# Patient Record
Sex: FEMALE
[De-identification: names, ages, dates, MRNs, and addresses within clinical notes are randomized per-mention and may not be internally consistent; named-entity substitution may affect disease eponyms.]

---

## 2018-01-31 ENCOUNTER — HOSPITAL ENCOUNTER (OUTPATIENT)
Dept: HOSPITAL 14 - H.OPSURG | Age: 38
Discharge: HOME | End: 2018-01-31
Attending: PODIATRIST
Payer: COMMERCIAL

## 2018-01-31 VITALS — SYSTOLIC BLOOD PRESSURE: 105 MMHG | OXYGEN SATURATION: 99 % | HEART RATE: 87 BPM | DIASTOLIC BLOOD PRESSURE: 65 MMHG

## 2018-01-31 VITALS — RESPIRATION RATE: 18 BRPM

## 2018-01-31 VITALS — TEMPERATURE: 98 F

## 2018-01-31 VITALS — BODY MASS INDEX: 33.3 KG/M2

## 2018-01-31 DIAGNOSIS — M77.31: ICD-10-CM

## 2018-01-31 DIAGNOSIS — M79.671: Primary | ICD-10-CM

## 2018-01-31 DIAGNOSIS — M72.2: ICD-10-CM

## 2018-01-31 PROCEDURE — 28008 INCISION OF FOOT FASCIA: CPT

## 2018-01-31 PROCEDURE — 73630 X-RAY EXAM OF FOOT: CPT

## 2018-01-31 PROCEDURE — 97116 GAIT TRAINING THERAPY: CPT

## 2018-01-31 PROCEDURE — 28119 REMOVAL OF HEEL SPUR: CPT

## 2018-01-31 PROCEDURE — 97161 PT EVAL LOW COMPLEX 20 MIN: CPT

## 2018-01-31 NOTE — CP.SDSHP
[facility]





- History


Proposed Procedure: right open plantar fasciotomy


Pre-Op Diagnosis: right painful plantar fasciotomy





- Allergies


Allergies: 


Allergies





No Known Allergies Allergy (Verified 01/31/18 06:38)


 











- Physical Exam


Vital Signs: 


 Vital Signs











  01/31/18 01/31/18





  07:00 07:36


 


Temperature 98.3 F 


 


Pulse Rate 68 68


 


Respiratory 18 





Rate  


 


Blood Pressure 111/76 


 


O2 Sat by Pulse 99 





Oximetry  











Mental Status: Alert & Oriented x3





- {Optional Preform as Required}


Integument: WNL





- Impression


Impression: Pt was seen and examined in SDS.  Pt NPO status was confirmed.  All 

pre-op testing and clearance in chart.  Pt has exhausted all conservative 

treatment at this time and is opting for surgical intervention.  Pt was 

explained procedure and post-operative course.  All pt's questions were 

answered to satisfaction.  No guarantees were made.  Pt understands all risks, 

benefits and complications of procedure.  Pt will follow-up with Dr. Sosa 

within 1 week of surgery





- Date & Time


Date: 01/31/18


Time: 07:30





Short Stay Discharge





- Short Stay Discharge


Admitting Diagnosis/Reason for Visit: M79.671


Disposition: HOME/ ROUTINE


Referrals: 


Vikas Grace Jr., MD [Primary Care Provider] - 


Instructions:  RICE Therapy (GEN)


Additional Instructions (Diet, Activity): 


-Patient in good/stable condition for discharge home


-Pt to resume medications per medical reconciliation


-Resume regular diet


Please keep dressing clean, dry, & intact 


-Use plastic bag over bandage for showering


-Wear post op shoe at all times when ambulating


-Call clinic if you see signs of infection (redness, swelling, malodor)


-Please make an appointment to see Dr. Sosa in office/clinic within 1 week 

for post-op check





Progress Note/Discharge Note with Instructions: 





- Patient evaluated bedside in recovery s/p surgical procedure.


- After surgical procedure patient in NAD


- (+) Void, (+) Appetite


- Capillary refill time <3s and NVSI intact.


- Patient denies complaints at this time


- Post operative instructions and plan of care explained to patient at length.


- Pt. acknowledges understanding.


- Patient stable for DC per podiatric surgery

## 2018-01-31 NOTE — CP.PCM.PN
Subjective





- Date & Time of Evaluation


Date of Evaluation: 01/31/18


Time of Evaluation: 07:52





- Subjective


Subjective: 





Podiatry Elva-operative Note- Dr. Sosa





37 y.o F with no PMH seen in [facility] for right open plantar fasciotomy. Patient 

reports that she had had the bilaterally foot pain R>L for a while. She 

describes the pain as a sharp pain that is worse with stance and first step in 

the morning. She rates her pain 7/10 today on VAS scale. She has tried 

stretching, night splint, and conservative treatment without relief. Patient 

has no other pedal complaint at this time. She reports last drinking or eating 

at 9pm last night. She reports no problems with anesthesia. She denies n/v/sob/

cp/chills/f or d.





PMH: none


PSH: wisdom tooth removal


SH: denies smoking, drinking or ilicit drug use


MEDS: multivitamin and omega 3


FH:father-benign brain tumor, high cholesterol; mother-DM and HTN


ALL: NKDA 





Objective





- Vital Signs/Intake and Output


Vital Signs (last 24 hours): 


 











Temp Pulse Resp BP Pulse Ox


 


 98.3 F   68   18   111/76   99 


 


 01/31/18 07:00  01/31/18 07:36  01/31/18 07:00  01/31/18 07:00  01/31/18 07:00











- Constitutional


Appears: Well, Non-toxic, No Acute Distress





- Extremities Exam


Additional comments: 





Vasc: DP and PT 2/4 bilaterally, CFT < 3 seconds x 10 digits, temperature warm 

to cool from proximal to distal toes, mild non-pitting edema noted to the right 

plantar rearfoot





Ortho: MM is 5/5 in all four compartments: dorsiflexion, plantarflexion, 

inversion, and eversion; Pain upon palpation of the medial plantar calcaneal 

tuberosity bilaterally R> L and central aspect of plantar heel bilaterally; 

pain elicited upon palpation along the plantar medial arch bilaterally; gastroc 

equinus deformity noted bilaterally with limitation of ankle dorsiflexion upon 

extension of knees and beyond 90 degrees of ankle dorsiflexion upon flexion of 

both knees; flexible pes planus deformity noted bilaterally with decrease arch 

height upon weightbearing; STJ inversion/eversion is 20 degrees/10 degrees 

bilaterally; heels are in 2 degrees valgus upon NCSP evaluation bilaterally; 

bilateral MTJ is congruent; no HAV deformity noted bilaterally with normal 1st 

MPJ ROM; no crepitus noted; no hammertoes noted





Gait: antalgic gait noted secondary to bilateral heel pain; increased pronation 

noted throughout the midstance phase of gait; early heel rise present 

bilaterally secondary to equinus; decreased arch height noted throughout the 

stance phase of gait bilaterally





Neuro: protective and gross sensation intact bilaterally





Derm: no open lesions noted, skin tugor WNL, no clinical signs of infection





- Neurological Exam


Neurological Exam: Alert, Awake, Oriented x3





- Psychiatric Exam


Psychiatric exam: Normal Affect, Normal Mood





Assessment and Plan





- Assessment and Plan (Free Text)


Assessment: 





37 y.o F with no PMH seen in [facility] for right open plantar fasciotomy


Plan: 





Pt was seen and examined in SDS





Pt NPO status was confirmed


All pre-op testing and clearance in chart


Pt has exhausted all conservative treatment at this time and is opting for 

surgical intervention


Pt was explained procedure and post-operative course


All pt's questions were answered to satisfaction


No guarantees were made


Pt understands all risks, benefits and complications of procedure


Pt will follow-up with Dr. Sosa within 1 week of surgery

## 2018-01-31 NOTE — RAD
PROCEDURE:  Right Foot Radiographs.



HISTORY:

s/p right foot plantar fasciotomy  



COMPARISON:

None.



FINDINGS:



BONES:

Inferior posterior calcaneal spurring. No fracture. 



JOINTS:

Normal. 



SOFT TISSUES:

Normal. 



OTHER FINDINGS:

None.



IMPRESSION:

Calcaneal spurring.

## 2018-01-31 NOTE — PCM.SURG1
Surgeon's Initial Post Op Note





- Surgeon's Notes


Surgeon: Dr. Juancarlos Sosa, DPM


Assistant: Matt Waite PGY1


Type of Anesthesia: IV Sedation, Local


Anesthesia Administered By: Dr. Altamirano


Pre-Operative Diagnosis: Painful plantar fasciitis right foot


Operative Findings: See dictation report.  M- 3-0 nylon.  I- Pre-op: 25 cc 1:1 1

% lidocaine plain, 0.5% marcaine plain.  1 cc Kenalog-40


Post-Operative Diagnosis: Same


Operation Performed: 1. Plantar fasciotomy with C-arm assistance.  2. 

Debridement of calcaneal spur with manual bone rasp


Specimen/Specimens Removed: None


Estimated Blood Loss: EBL {In ML}: 0


Blood Products Given: N/A


Drains Used: No Drains


Post-Op Condition: Good


Date of Surgery/Procedure: 01/31/18


Time of Surgery/Procedure: 09:07

## 2018-02-01 NOTE — OP
PROCEDURE DATE:  01/31/2018



PATIENT'S AGE:  37



SURGEON:  Juancarlos Sosa DPM.



ASSISTANT:  Matt Waite, PGY-1.



ANESTHESIOLOGIST:  Naif Altamirano MD.



ANESTHESIA:  IV sedation with local.



PREOPERATIVE DIAGNOSIS:  Painful plantar fascitis of the right foot.



POSTOPERATIVE DIAGNOSIS:  Painful plantar fascitis of the right foot.



NAME OF PROCEDURE:

1.  Plantar fasciotomy with C-arm assistance.

2.  Debridement of calcaneal spur with manual bone rasp.



INDICATION:  The patient is a 37-year-old female with the above diagnosis. 

The patient has exhausted all conservative treatments at this time and now

requires surgical intervention.  The patient signed consent after careful

explanation of risks, benefits, complications, and alternatives for

surgical procedure.  No guarantees were given nor implied.  N.p.o. status

was confirmed prior to taking the patient to the OR.



PREPARATION:  The patient was brought into the operating room and placed on

the operating room table in the supine position.  Timeout was performed for

identification of the correct patient and procedure.  After induction of IV

sedation, the patient received a total of 25 mL of a 1:1 mixture of 1%

lidocaine plain and 0.5% Marcaine plain in a local block fashion to the

right plantar foot at the level of the plantar fascial insertion into the

calcaneus.  The right lower extremity was then prepped and draped in the

normal sterile manner and the procedure began.  No tourniquet was used

during the procedure.



DESCRIPTION OF PROCEDURE:

PROCEDURE 1:  Plantar fasciotomy with C-arm assistance.  Attention was then

turned to the plantar aspect of the patient's right foot where

preoperatively the area of maximum pain had been marked out using a marking

pen. C-arm fluoroscopy was then used along with an 18-gauge spinal needle

to determine the level of the plantar calcaneal tuberosity where the

plantar fascia inserted into the calcaneus.  It was noted on x-ray that a

calcaneal spur was located at this position.  Utilizing both the

preoperative marking with the marking pen as well as the C-arm fluoroscopy

with the spinal needle, a small incision was made at the same level using a

#15 blade down to the level of bone and the plantar fascia that inserted at

this location was cut from its insertion site at the calcaneus.  A #10 blade

was then inserted into the same incision site and again a lateral to medial

sweep was made along the calcaneal insertion site to ensure that all

plantar fascia at that area was no longer attached to the calcaneus.



PROCEDURE 2:  Debridement of calcaneal spur with manual bone rasp.  A bone

rasp was then inserted into the incision site and the plantar calcaneal

spur that was previously seen on fluoroscopy was rasped down until it was

smooth and contoured evenly with the remainder of the calcaneus.  A 10 mL

syringe with an 18-gauge needle was then introduced into the surgical site

with normal sterile saline and the area was flushed copiously with this

normal sterile saline.  The surgical site was then closed with superficial

3-0 nylon sutures and the site was dressed with Xeroform dry sterile

dressing and a light ACE bandage; 1 mL of Kenalog 40 was also injected into

the area postoperatively before the incision site was dressed.



POSTOPERATIVE CONDITION:  The patient tolerated the anesthesia and

procedure well and was escorted to the recovery room with vital signs

stable and neurovascular status intact to the right lower extremity.  The

patient is to remain nonweightbearing to the right lower extremity with the

assistance of crutches and will be discharged from the hospital later

today.  Patient will follow up with Dr. Sosa in his private office at

her next regularly scheduled appointment.





__________________________________________

Matt Waite DPM





__________________________________________

Juancarlos Sosa DPM



DD:  01/31/2018 10:51:51

DT:  01/31/2018 13:32:07

Job # [id number]





[id number]

## 2018-02-27 ENCOUNTER — HOSPITAL ENCOUNTER (INPATIENT)
Dept: HOSPITAL 14 - H.ER | Age: 38
LOS: 3 days | Discharge: HOME | DRG: 300 | End: 2018-03-02
Attending: INTERNAL MEDICINE | Admitting: INTERNAL MEDICINE
Payer: COMMERCIAL

## 2018-02-27 VITALS — BODY MASS INDEX: 33.3 KG/M2

## 2018-02-27 DIAGNOSIS — T81.718A: Primary | ICD-10-CM

## 2018-02-27 DIAGNOSIS — I26.99: ICD-10-CM

## 2018-02-27 DIAGNOSIS — G47.33: ICD-10-CM

## 2018-02-27 DIAGNOSIS — T81.72XA: ICD-10-CM
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
## 2018-02-27 DIAGNOSIS — I82.431: ICD-10-CM

## 2018-02-27 LAB
ALBUMIN SERPL-MCNC: 3.9 G/DL (ref 3.5–5)
ALBUMIN/GLOB SERPL: 1.2 {RATIO} (ref 1–2.1)
ALT SERPL-CCNC: 36 U/L (ref 9–52)
AST SERPL-CCNC: 24 U/L (ref 14–36)
BASOPHILS # BLD AUTO: 0 K/UL (ref 0–0.2)
BASOPHILS NFR BLD: 0.6 % (ref 0–2)
BUN SERPL-MCNC: 11 MG/DL (ref 7–17)
CALCIUM SERPL-MCNC: 9.1 MG/DL (ref 8.4–10.2)
EOSINOPHIL # BLD AUTO: 0.3 K/UL (ref 0–0.7)
EOSINOPHIL NFR BLD: 4.2 % (ref 0–4)
ERYTHROCYTE [DISTWIDTH] IN BLOOD BY AUTOMATED COUNT: 13.4 % (ref 11.5–14.5)
GFR NON-AFRICAN AMERICAN: > 60
HGB BLD-MCNC: 12.2 G/DL (ref 12–16)
INR PPP: 1 (ref 0.9–1.2)
LYMPHOCYTES # BLD AUTO: 2.1 K/UL (ref 1–4.3)
LYMPHOCYTES NFR BLD AUTO: 26.6 % (ref 20–40)
MCH RBC QN AUTO: 30.2 PG (ref 27–31)
MCHC RBC AUTO-ENTMCNC: 33.4 G/DL (ref 33–37)
MCV RBC AUTO: 90.5 FL (ref 81–99)
MONOCYTES # BLD: 0.6 K/UL (ref 0–0.8)
MONOCYTES NFR BLD: 8.1 % (ref 0–10)
NEUTROPHILS # BLD: 4.7 K/UL (ref 1.8–7)
NEUTROPHILS NFR BLD AUTO: 60.5 % (ref 50–75)
NRBC BLD AUTO-RTO: 0.1 % (ref 0–0)
PLATELET # BLD: 360 K/UL (ref 130–400)
PMV BLD AUTO: 7.6 FL (ref 7.2–11.7)
PROTHROMBIN TIME: 11 SECONDS (ref 9.8–13.1)
RBC # BLD AUTO: 4.05 MIL/UL (ref 3.8–5.2)
WBC # BLD AUTO: 7.8 K/UL (ref 4.8–10.8)

## 2018-02-27 NOTE — ED PDOC
Lower Extremity Pain/Injury


Chief Complaint (Nursing): Lower Extremity Problem/Injury


Chief Complaint (Provider): leg pain 


History Per: Patient


History/Exam Limitations: no limitations


Onset/Duration Of Symptoms: Days (14)


Current Symptoms Are (Timing): Still Present


Additional Complaint(s): 





39 yo ,f, PMhx/o VAN, s/p Right plantar fasciotomy 18 presents to ED after 

having lower ext US that showed DVT right leg. Patient reports right leg pain 

started 2 weeks ago, intermittent, 5/10 intensity after she started using a 

therapeutic boot, associated with mild swelling and redness only for 3 days 

when onset of symptoms. She also c/o  URI symptoms started 5 days ago (cough, 

sneezing, sore throat) and subjective fever 2 days ago, only one episode. she 

denies chest pain, SOB, n,v,d,abd pain, dysuria, sick contact, hx/o DVT. 

Patient had evaluation by Podiatrist today and due to persistent pain after the 

surgery, a leg us was ordered and showed DVT involving right popliteal vein. 





PMD: Vikas Grace


Podiatrist: ricky Sosa





- Risk Factors


DVT Risk Factors: Pos: Decreased Mobility, Decreased Activity, Extremity 

Immobiliztion





Past Medical History


Vital Signs: 





 Last Vital Signs











Temp  98.0 F   18 15:55


 


Pulse  81   18 15:55


 


Resp  16   18 15:55


 


BP  103/70   18 15:55


 


Pulse Ox  100   18 15:55














- Medical History


Other PMH: VAN





- Family History


Family History: States: Diabetes (mother), Hypertension (mother), Other





- Social History


Alcohol: None


Drugs: Denies





- Home Medications


Home Medications: 


 Ambulatory Orders











 Medication  Instructions  Recorded


 


Pseudoephedrine HCl [Sudafed] 30 mg PO Q6 PRN 18


 


Rivaroxaban [Xarelto] 15 mg PO BID #42 tab 18


 


Rivaroxaban [Xarelto] 20 mg PO DAILY #14 tab 18














- Allergies


Allergies/Adverse Reactions: 


 Allergies











Allergy/AdvReac Type Severity Reaction Status Date / Time


 


No Known Allergies Allergy   Verified 18 06:38














Wells Criteria for PE





- Wells Criteria for Pulmonary Embolism


P.E is #1 Diagnosis, or Equally Likely: No


Heart Rate >100: No


Immobilization at least 3 days;Surgery previous 4 weeks: Yes


Previous, objectively diagnosed PE or DVT: No


Hemoptysis: No


Malignancy w/treatment within 6 months, or palliative: No


Total Score: 1.5





Review of Systems


Respiratory: Positive for: Cough, Other (sore throat)


Musculoskeletal: Positive for: Leg Pain





Physical Exam





- Physical Exam


Appears: Positive for: Well, No Acute Distress


Head Exam: Positive for: ATRAUMATIC, NORMOCEPHALIC


Skin: Positive for: Normal Color


Eye Exam: Positive for: Normal appearance


Neck: Positive for: Normal


Cardiovascular/Chest: Positive for: Regular Rate, Rhythm.  Negative for: Edema, 

Murmur


Respiratory: Positive for: Normal Breath Sounds.  Negative for: Crackles, Rales

, Rhonchi, Wheezing


Gastrointestinal/Abdominal: Positive for: Soft.  Negative for: Tenderness, 

Guarding, Rebound


Back: Positive for: Normal Inspection.  Negative for: L CVA Tenderness, R CVA 

Tenderness


Extremity: Positive for: Normal ROM, Calf Tenderness (right side calf 

tenderness. Aggie sign positive), Capillary Refill (less 3 sec), Other (small  

hematoma 2 cm medial side right ankle. DT and PT pulses normal  ).  Negative for

: Pedal Edema, Swelling


Neurologic/Psych: Positive for: Alert, Oriented.  Negative for: Motor/Sensory 

Deficits





- Laboratory Results


Result Diagrams: 


 18 05:30





 18 05:30





- ECG


O2 Sat by Pulse Oximetry: 100





Medical Decision Making


Medical Decision Makin: 15 PM


39 yo ,f, PMhx/o VAN, s/p right plantar fasciotomy 18, immobilization, 

taking birth control medications, sent with US findings of DVT





Impresion


DVT involving right popliteal vein.





Differential 


Venous insufficiency, cellulitis, baker cyst ruptured





Plan


CBC, CMP , PT/INR


EKG, troponin, CXR


-US  Duplex right leg 








Lovenox therapeutic 90 mg sc stat


18:42


Labs reviewed, US Duplex showed DVT distal popliteal vein. 


Patient will be admitted to c/w treatment. patient agree with the plan. 








 





Disposition





- Clinical Impression


Clinical Impression: 


 DVT (deep venous thrombosis)








- Disposition


Disposition Time: 22:00


Condition: FAIR

## 2018-02-27 NOTE — US
PROCEDURE:  Right lower extremity venous duplex Doppler. 



HISTORY:

DVT. 



COMPARISON:

None available.



TECHNIQUE:

Common femoral, superficial femoral, popliteal and posterior tibial 

veins were evaluated. Flow was assessed with color Doppler, 

compressibility, assessment of phasic flow and augmentation response. 



FINDINGS:



COMMON FEMORAL VEIN:

Unremarkable.



SUPERFICIAL FEMORAL VEIN:

Unremarkable.



POPLITEAL VEIN:

There is thrombus seen within distal popliteal vein (partially 

compressible) with some flow seen in the mid and proximal popliteal 

vein. .



POSTERIOR TIBIAL VEIN:

Unremarkable.



OTHER FINDINGS:

None.



IMPRESSION:

DVT seen within the distal popliteal vein. Findings discussed with 

Dr. Rolon at approximately approximately 1820 p.m. with written down 

and read back verification.

## 2018-02-28 LAB
ALBUMIN SERPL-MCNC: 3.5 G/DL (ref 3.5–5)
ALBUMIN/GLOB SERPL: 1.1 {RATIO} (ref 1–2.1)
ALT SERPL-CCNC: 33 U/L (ref 9–52)
AST SERPL-CCNC: 21 U/L (ref 14–36)
BUN SERPL-MCNC: 13 MG/DL (ref 7–17)
CALCIUM SERPL-MCNC: 8.7 MG/DL (ref 8.4–10.2)
ERYTHROCYTE [DISTWIDTH] IN BLOOD BY AUTOMATED COUNT: 13.6 % (ref 11.5–14.5)
GFR NON-AFRICAN AMERICAN: > 60
HDLC SERPL-MCNC: 31 MG/DL (ref 30–70)
HGB BLD-MCNC: 12 G/DL (ref 12–16)
LDLC SERPL-MCNC: 117 MG/DL (ref 0–129)
MCH RBC QN AUTO: 30.7 PG (ref 27–31)
MCHC RBC AUTO-ENTMCNC: 34.1 G/DL (ref 33–37)
MCV RBC AUTO: 90 FL (ref 81–99)
PLATELET # BLD: 367 K/UL (ref 130–400)
RBC # BLD AUTO: 3.9 MIL/UL (ref 3.8–5.2)
VIT B12 SERPL-MCNC: 248 PG/ML (ref 239–931)
WBC # BLD AUTO: 7.4 K/UL (ref 4.8–10.8)

## 2018-02-28 RX ADMIN — PROMETHAZINE HYDROCHLORIDE AND CODEINE PHOSPHATE PRN ML: 6.25; 1 SOLUTION ORAL at 08:54

## 2018-02-28 RX ADMIN — ENOXAPARIN SODIUM SCH MG: 100 INJECTION SUBCUTANEOUS at 13:02

## 2018-02-28 RX ADMIN — PROMETHAZINE HYDROCHLORIDE AND CODEINE PHOSPHATE PRN ML: 6.25; 1 SOLUTION ORAL at 22:02

## 2018-02-28 RX ADMIN — ENOXAPARIN SODIUM SCH MG: 100 INJECTION SUBCUTANEOUS at 20:40

## 2018-02-28 RX ADMIN — PROMETHAZINE HYDROCHLORIDE AND CODEINE PHOSPHATE PRN ML: 6.25; 1 SOLUTION ORAL at 00:30

## 2018-02-28 NOTE — CT
PROCEDURE:  CT Chest with contrast (Pulmonary Angiogram)



HISTORY:

R/o PE, has DVT with recent right plantar surgery



COMPARISON:

None available. 



TECHNIQUE:

Axial computed tomography images were obtained of the chest in the 

pulmonary arterial phase of enhancement. Coronal and sagittal 

reformatted images were created and reviewed.



Intravenous contrast dose: 95 mL Visipaque 320



Radiation dose:



Total exam DLP = 436.66 mGy-cm.



This CT exam was performed using one or more of the following dose 

reduction techniques: Automated exposure control, adjustment of the 

mA and/or kV according to patient size, and/or use of iterative 

reconstruction technique.



FINDINGS:



PULMONARY ARTERIES:

There are multiple filling defects in left lower lobe 

segmental/subsegmental pulmonary artery branches.  There is a 

curvilinear filling defect seen in the left upper lobe pulmonary 

artery, possibly chronic. There is a curvilinear filling defect in 

the left lower lobe pulmonary artery, possibly chronic.  Curvilinear 

filling defect seen in the right lower lobe pulmonary artery, 

possibly chronic. No occlusive filling defects seen in right lower 

lobe pulmonary artery branches. No other pulmonary arterial filling 

defects are appreciated. 



AORTA:

No acute findings. No thoracic aortic aneurysm. 



LUNGS:

Unremarkable. No nodule, mass or pulmonary consolidation. 



PLEURAL SPACES:

Unremarkable. No effusion or pneuomothorax. 



HEART:

Unremarkable. No cardiomegaly. No significant pericardial effusion. 



LYMPH NODES:

No lymphadenopathy.



BONES, CHEST WALL:

Unremarkable. No fracture or destructive lesion 



OTHER FINDINGS:

Unremarkable. 



IMPRESSION:

Findings consistent with acute left lower lobe pulmonary artery 

emboli. Possible chronic emboli in left upper lobe, left lower lobe 

and right lower lobe as well. 



These findings were discussed with ADAM Barriga, at 11:56 a.m. 

by telephone.

## 2018-02-28 NOTE — CP.PCM.HP
History of Present Illness





- History of Present Illness


History of Present Illness: 





Patient evaluated at bedside with Dr Clayton this morning. 


39 yo ,f, s/p Right plantar fasciotomy 1/31/18 presented to ED Yesterday c/o 

leg pain. Patient reports right leg pain started 2 weeks ago, intermittent,  

after she started using the therapeutic boot after the Sx.  she denies chest 

pain, SOB, n,v,d,abd pain, dysuria, sick contact, hx/o DVT. Patient had 

evaluation by Podiatrist Yesterday and due to persistent pain after the surgery

, a leg us was ordered and showed DVT involving right popliteal vein. 





PMD: Vikas Grace


Podiatrist: Juancarlos Sosa











Present on Admission





- Present on Admission


Any Indicators Present on Admission: No





Review of Systems





- Review of Systems


All systems: reviewed and no additional remarkable complaints except





- Musculoskeletal


Musculoskeletal: Other


Additional comments: 





LE pain





Past Patient History





- Past Medical History & Family History


Past Medical History?: No





- Past Social History


Smoking Status: Never Smoked





- MUSCULOSKELETAL/RHEUMATOLOGICAL


Hx Falls: No





- PSYCHIATRIC


Hx Substance Use: No





- SURGICAL HISTORY


Hx Surgeries: Yes


Other/Comment: Right plantar fasciotomy





- ANESTHESIA


Hx Anesthesia: Yes


Hx Anesthesia Reactions: No


Hx Malignant Hyperthermia: No





Meds


Allergies/Adverse Reactions: 


 Allergies











Allergy/AdvReac Type Severity Reaction Status Date / Time


 


No Known Allergies Allergy   Verified 01/31/18 06:38














Physical Exam





- Constitutional


Appears: Non-toxic, No Acute Distress





- Head Exam


Head Exam: NORMAL INSPECTION





- Eye Exam


Eye Exam: EOMI, PERRL





- ENT Exam


ENT Exam: Mucous Membranes Moist





- Respiratory Exam


Respiratory Exam: Clear to Auscultation Bilateral, NORMAL BREATHING PATTERN.  

absent: Rales





- Cardiovascular Exam


Cardiovascular Exam: REGULAR RHYTHM, +S1, +S2.  absent: Gallop





- GI/Abdominal Exam


GI & Abdominal Exam: Normal Bowel Sounds, Soft.  absent: Tenderness





- Extremities Exam


Extremities exam: Positive for: tenderness (Mild R/leg)





- Neurological Exam


Neurological exam: Alert, Normal Gait, Oriented x3





- Skin


Skin Exam: Warm





Results





- Vital Signs


Recent Vital Signs: 





 Last Vital Signs











Temp  98.2 F   02/28/18 07:51


 


Pulse  68   02/28/18 07:51


 


Resp  18   02/28/18 07:51


 


BP  96/58 L  02/28/18 07:51


 


Pulse Ox  98   02/28/18 07:51














- Labs


Result Diagrams: 


 02/28/18 05:30





 02/28/18 05:30


Labs: 





 Laboratory Results - last 24 hr











  02/27/18 02/27/18 02/27/18





  17:26 17:26 17:26


 


WBC  7.8  


 


RBC  4.05  


 


Hgb  12.2  


 


Hct  36.7  


 


MCV  90.5  


 


MCH  30.2  


 


MCHC  33.4  


 


RDW  13.4  


 


Plt Count  360  


 


MPV  7.6  


 


Neut % (Auto)  60.5  


 


Lymph % (Auto)  26.6  


 


Mono % (Auto)  8.1  


 


Eos % (Auto)  4.2 H  


 


Baso % (Auto)  0.6  


 


Neut # (Auto)  4.7  


 


Lymph # (Auto)  2.1  


 


Mono # (Auto)  0.6  


 


Eos # (Auto)  0.3  


 


Baso # (Auto)  0.0  


 


PT    11.0


 


INR    1.0


 


Sodium   142 


 


Potassium   4.2 


 


Chloride   104 


 


Carbon Dioxide   25 


 


Anion Gap   17 


 


BUN   11 


 


Creatinine   0.6 L 


 


Est GFR ( Amer)   > 60 


 


Est GFR (Non-Af Amer)   > 60 


 


Random Glucose   86 


 


Calcium   9.1 


 


Total Bilirubin   0.2 


 


AST   24 


 


ALT   36 


 


Alkaline Phosphatase   69 


 


Total Protein   7.2 


 


Albumin   3.9 


 


Globulin   3.3 


 


Albumin/Globulin Ratio   1.2 


 


Triglycerides   


 


Cholesterol   


 


LDL Cholesterol Direct   


 


HDL Cholesterol   


 


Vitamin B12   














  02/28/18 02/28/18





  05:30 05:30


 


WBC  7.4 


 


RBC  3.90 


 


Hgb  12.0 


 


Hct  35.1 


 


MCV  90.0 


 


MCH  30.7 


 


MCHC  34.1 


 


RDW  13.6 


 


Plt Count  367 


 


MPV  


 


Neut % (Auto)  


 


Lymph % (Auto)  


 


Mono % (Auto)  


 


Eos % (Auto)  


 


Baso % (Auto)  


 


Neut # (Auto)  


 


Lymph # (Auto)  


 


Mono # (Auto)  


 


Eos # (Auto)  


 


Baso # (Auto)  


 


PT  


 


INR  


 


Sodium   140


 


Potassium   4.4


 


Chloride   103


 


Carbon Dioxide   27


 


Anion Gap   14


 


BUN   13


 


Creatinine   0.7


 


Est GFR ( Amer)   > 60


 


Est GFR (Non-Af Amer)   > 60


 


Random Glucose   89


 


Calcium   8.7


 


Total Bilirubin   0.2


 


AST   21


 


ALT   33


 


Alkaline Phosphatase   65


 


Total Protein   6.6


 


Albumin   3.5


 


Globulin   3.2


 


Albumin/Globulin Ratio   1.1


 


Triglycerides   138


 


Cholesterol   178


 


LDL Cholesterol Direct   117


 


HDL Cholesterol   31


 


Vitamin B12   248














Assessment & Plan





- Assessment and Plan (Free Text)


Assessment: 





R/leg DVT


S/P plantar fasciotomy


First episode of DVT


Switch from Lovenox to Xarelto for anticoag


Hem-Onc consult. Will f/u recs

## 2018-02-28 NOTE — CP.PCM.CON
History of Present Illness





- History of Present Illness


History of Present Illness: 





This is a 38 yrs old female who had a surgery done for plantar fasciatis by Dr Sosa.. She was asked to stay in bed for 2-3  weeks. She was ten given a 

orthopedic boot. She however was having a significant amount of pain in the 

foot and the calf. She came to  [facility] where se ad a venous doppler, and fond to 

have a DVT  the right lower extremity. She was started on lovenox and then 

converted to xarelto. Pt has had some cough since she developed the DVT. Chest 

Xray was normal but a CT scan of the chest was not done. 


She gives a h/o her mother and her sister having similar problems after 

surgery. 





Past Patient History





- Past Medical History & Family History


Past Medical History?: No





- Past Social History


Smoking Status: Never Smoked





- MUSCULOSKELETAL/RHEUMATOLOGICAL


Hx Falls: No





- PSYCHIATRIC


Hx Substance Use: No





- SURGICAL HISTORY


Hx Surgeries: Yes


Other/Comment: Right plantar fasciotomy





- ANESTHESIA


Hx Anesthesia: Yes


Hx Anesthesia Reactions: No


Hx Malignant Hyperthermia: No





Meds


Allergies/Adverse Reactions: 


 Allergies











Allergy/AdvReac Type Severity Reaction Status Date / Time


 


No Known Allergies Allergy   Verified 01/31/18 06:38














- Medications


Medications: 


 Current Medications





Acetaminophen (Tylenol 325mg Tab)  650 mg PO Q6 PRN


   PRN Reason: Pain, Mild (1-3)


Promethazine HCl/Codeine (Phenergan/Codeine Oral Syrup)  5 ml PO Q6 PRN


   PRN Reason: Cough


   Last Admin: 02/28/18 08:54 Dose:  5 ml


Rivaroxaban (Xarelto)  15 mg PO Q12 BRADEN


   PRN Reason: Protocol











Physical Exam





- Additional Findings


Additional findings: 





Physwical exam; Alert,well oriented in no acute distress


neck; Supple, no adsenopathy


Chest; clear, no rales or rhonchi


Heart, RSR, no murmur


Abd; soft, no mass. no h/s megaly





Results





- Vital Signs


Recent Vital Signs: 


 Last Vital Signs











Temp  98.2 F   02/28/18 07:51


 


Pulse  68   02/28/18 07:51


 


Resp  18   02/28/18 07:51


 


BP  96/58 L  02/28/18 07:51


 


Pulse Ox  98   02/28/18 07:51














- Labs


Result Diagrams: 


 02/28/18 05:30





 02/28/18 05:30


Labs: 


 Laboratory Results - last 24 hr











  02/27/18 02/27/18 02/27/18





  17:26 17:26 17:26


 


WBC  7.8  


 


RBC  4.05  


 


Hgb  12.2  


 


Hct  36.7  


 


MCV  90.5  


 


MCH  30.2  


 


MCHC  33.4  


 


RDW  13.4  


 


Plt Count  360  


 


MPV  7.6  


 


Neut % (Auto)  60.5  


 


Lymph % (Auto)  26.6  


 


Mono % (Auto)  8.1  


 


Eos % (Auto)  4.2 H  


 


Baso % (Auto)  0.6  


 


Neut # (Auto)  4.7  


 


Lymph # (Auto)  2.1  


 


Mono # (Auto)  0.6  


 


Eos # (Auto)  0.3  


 


Baso # (Auto)  0.0  


 


PT    11.0


 


INR    1.0


 


Sodium   142 


 


Potassium   4.2 


 


Chloride   104 


 


Carbon Dioxide   25 


 


Anion Gap   17 


 


BUN   11 


 


Creatinine   0.6 L 


 


Est GFR ( Amer)   > 60 


 


Est GFR (Non-Af Amer)   > 60 


 


Random Glucose   86 


 


Calcium   9.1 


 


Total Bilirubin   0.2 


 


AST   24 


 


ALT   36 


 


Alkaline Phosphatase   69 


 


Total Protein   7.2 


 


Albumin   3.9 


 


Globulin   3.3 


 


Albumin/Globulin Ratio   1.2 


 


Triglycerides   


 


Cholesterol   


 


LDL Cholesterol Direct   


 


HDL Cholesterol   


 


Vitamin B12   


 


TSH 3rd Generation   














  02/28/18 02/28/18





  05:30 05:30


 


WBC  7.4 


 


RBC  3.90 


 


Hgb  12.0 


 


Hct  35.1 


 


MCV  90.0 


 


MCH  30.7 


 


MCHC  34.1 


 


RDW  13.6 


 


Plt Count  367 


 


MPV  


 


Neut % (Auto)  


 


Lymph % (Auto)  


 


Mono % (Auto)  


 


Eos % (Auto)  


 


Baso % (Auto)  


 


Neut # (Auto)  


 


Lymph # (Auto)  


 


Mono # (Auto)  


 


Eos # (Auto)  


 


Baso # (Auto)  


 


PT  


 


INR  


 


Sodium   140


 


Potassium   4.4


 


Chloride   103


 


Carbon Dioxide   27


 


Anion Gap   14


 


BUN   13


 


Creatinine   0.7


 


Est GFR ( Amer)   > 60


 


Est GFR (Non-Af Amer)   > 60


 


Random Glucose   89


 


Calcium   8.7


 


Total Bilirubin   0.2


 


AST   21


 


ALT   33


 


Alkaline Phosphatase   65


 


Total Protein   6.6


 


Albumin   3.5


 


Globulin   3.2


 


Albumin/Globulin Ratio   1.1


 


Triglycerides   138


 


Cholesterol   178


 


LDL Cholesterol Direct   117


 


HDL Cholesterol   31


 


Vitamin B12   248


 


TSH 3rd Generation   1.43














Assessment & Plan





- Assessment and Plan (Free Text)


Assessment: 





impression; DVT right lower extremity.post surgery








Plan; Have ordered a CT scan of the chest to r/o pulmonary embolism.


       Agree with the xarelto for treatment.


       Length of treatment will depend on the result of CT scan and if she has 

a hypercoagulopathy





- Date & Time


Date: 02/28/18


Time: 11:01

## 2018-02-28 NOTE — RAD
HISTORY:

dvt  



COMPARISON:

No prior.



TECHNIQUE:

Chest PA and lateral



FINDINGS:



LUNGS:

No active pulmonary disease.



PLEURA:

No significant pleural effusion identified. No pneumothorax apparent.



CARDIOVASCULAR:

Normal.



OSSEOUS STRUCTURES:

No significant abnormalities.



VISUALIZED UPPER ABDOMEN:

Normal.



OTHER FINDINGS:

None.



IMPRESSION:

No active disease.

## 2018-02-28 NOTE — CARD
--------------- APPROVED REPORT --------------





EKG Measurement

Heart [id number]

[id number]

[id number]

[id number]

[id number]



<Conclusion>

Normal sinus rhythm

Normal ECG

## 2018-03-01 RX ADMIN — ALBUTEROL SULFATE PRN MG: 2.5 SOLUTION RESPIRATORY (INHALATION) at 18:02

## 2018-03-01 RX ADMIN — ENOXAPARIN SODIUM SCH MG: 100 INJECTION SUBCUTANEOUS at 20:37

## 2018-03-01 RX ADMIN — ENOXAPARIN SODIUM SCH MG: 100 INJECTION SUBCUTANEOUS at 08:47

## 2018-03-01 RX ADMIN — ALBUTEROL SULFATE PRN MG: 2.5 SOLUTION RESPIRATORY (INHALATION) at 10:41

## 2018-03-01 RX ADMIN — PROMETHAZINE HYDROCHLORIDE AND CODEINE PHOSPHATE PRN ML: 6.25; 1 SOLUTION ORAL at 18:45

## 2018-03-01 NOTE — PN
DATE:  03/01/2018



SUBJECTIVE:  The patient seen and examined.  Interim events noted.  Consult

noted and appreciated.  The patient with DVT and multiple pulmonary

embolism.  The patient feels okay.  Denies any chest pain or shortness of

breath, but had episodes of coughing yesterday.  Neck pain is adequately

controlled.



PHYSICAL EXAMINATION

GENERAL:  The patient is in no acute distress.

VITAL SIGNS:  Stable.

HEART:  S1 and S2 normal and regular.

LUNGS:  Good bilateral air exchange.

ABDOMEN:  Soft and nontender.

EXTREMITIES:  The patient has DVT.  No _____ acute neurological compromise.

No edema.  No gangrene.

CENTRAL NERVOUS SYSTEM:  Exam is essentially unchanged.



DIAGNOSTIC DATA:  Available diagnostic data reviewed.



ASSESSMENT AND PLAN:  Overall, the patient's medical condition is stable. 

The patient might need long term anticoagulation.  _____ plan as discussed

with the patient.





__________________________________________

Tony Clatyon MD





DD:  03/01/2018 9:04:17

DT:  03/01/2018 10:56:45

Job # [id number]

## 2018-03-01 NOTE — CON
DATE:



HISTORY OF PRESENT ILLNESS:  Ms. James is a 38-year-old female who was

referred by  _____ for pulmonary evaluation.  She was admitted with

dizziness, thrombosis of the right lower extremity, and pulmonary emboli

and is under treatment for the above and referred for pulmonary evaluation

for appropriate followup and therapy of cough and chest tightness.  She

recently had right foot surgery for plantar fasciitis and has been in bed

for the past several weeks prior to developing swelling of the right lower

extremity.  She was seen in the Emergency Room, evaluated and found to have

right DVT.  She also has a history of using birth control pills, otherwise,

past medical history is unremarkable.



FAMILY HISTORY:  Remarkable for mother and sister that have dizziness and

thrombosis of the lower extremity and father who had brain surgery.



REVIEW OF SYSTEMS:  Essentially unremarkable except for right foot pain.



PHYSICAL EXAMINATION:

GENERAL:  The patient is alert, oriented, appears to be presently

comfortable in no apparent distress except for cough.

VITAL SIGNS:  Blood pressure 100/64, pulse of 79, respiratory rate of 20. 

She is febrile.  O2 sat of 99% on room air.

SKIN:  Shows fair turgor.

HEENT:  Pupils are equal and reactive to accommodation.  JVP flat.  Mouth

shows fair hygiene.

LUNGS:  Fair aeration, with some scattered bilateral basilar rales.

HEART:  Regular.  No murmurs or gallops.

ABDOMEN:  Soft and nontender, no organomegaly.

EXTREMITIES:  There is no calf tenderness bilaterally, but there is

tenderness over the right ankle from a surgical site.

CENTRAL NERVOUS SYSTEM:  Exam grossly intact.



LABORATORY DATA:  WBC 7.4, hemoglobin 12.0, and platelet count 367,000. 

Sodium 140, potassium 4.4, BUN 13, and creatinine 0.7.  PT 11 and INR 1.0. 

Chest x-ray; no acute cardiopulmonary pathology.  Extremity ultrasound is

remarkable for DVT within this popliteal vein on the right lower extremity.

EKG; normal sinus rhythm.  CT scan of the chest, findings consistent with

acute left lower lobe pulmonary artery emboli and possible chronic emboli

in left upper lobe, left lower lobe and right lower lobe as well.



IMPRESSION AND PLAN:  Dizziness, thrombosis of right lower extremity with

bilateral pulmonary emboli.  This could be secondary to the patient's

recent surgery and immobilization, but the patient also has chronic

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

dyscrasias or coagulopathy, once suggest continue therapy with

anticoagulation, antitussives will be given as well as bronchodilators

p.r.n.  If the patient is clinically stable and able to ambulate without

difficulty, it will be okay to discharge in the next 24 hours on

anticoagulation therapy for another 3-6 months.













Thank you for this consultation.





__________________________________________

Rafa Breen MD





DD:  03/01/2018 10:14:54

DT:  03/01/2018 10:19:36

Job # [id number]

## 2018-03-01 NOTE — CP.PCM.PN
Subjective





- Date & Time of Evaluation


Date of Evaluation: 03/01/18


Time of Evaluation: 09:22





- Subjective


Subjective: 





Pt had a ct scan of the lung done yesterday, She had numerous small thrombi,but 

there is evidence of an old infarct in the left upper lobe, and also right 

lower lobe. The smaller ones are diffuse. She was started on lovenox. Since she 

had had more cough and some chest pain, i have asked pulmonologist to see her.





Objective





- Vital Signs/Intake and Output


Vital Signs (last 24 hours): 


 











Temp Pulse Resp BP Pulse Ox


 


 98.3 F   79   20   100/64   99 


 


 03/01/18 07:54  03/01/18 07:54  03/01/18 07:54  03/01/18 07:54  03/01/18 07:54











- Medications


Medications: 


 Current Medications





Acetaminophen (Tylenol 325mg Tab)  650 mg PO Q6 PRN


   PRN Reason: Pain, Mild (1-3)


   Last Admin: 03/01/18 00:21 Dose:  650 mg


Enoxaparin Sodium (Lovenox)  90 mg SC Q12 BRADEN


   PRN Reason: Protocol


   Last Admin: 03/01/18 08:47 Dose:  90 mg


Promethazine HCl/Codeine (Phenergan/Codeine Oral Syrup)  5 ml PO Q6 PRN


   PRN Reason: Cough


   Last Admin: 02/28/18 22:02 Dose:  5 ml











- Labs


Labs: 


 





 02/28/18 05:30 





 02/28/18 05:30 





 











PT  11.0 Seconds (9.8-13.1)   02/27/18  17:26    


 


INR  1.0  (0.9-1.2)   02/27/18  17:26

## 2018-03-02 VITALS — OXYGEN SATURATION: 98 %

## 2018-03-02 VITALS
HEART RATE: 76 BPM | DIASTOLIC BLOOD PRESSURE: 78 MMHG | RESPIRATION RATE: 20 BRPM | SYSTOLIC BLOOD PRESSURE: 114 MMHG | TEMPERATURE: 98.8 F

## 2018-03-02 RX ADMIN — PROMETHAZINE HYDROCHLORIDE AND CODEINE PHOSPHATE PRN ML: 6.25; 1 SOLUTION ORAL at 09:16

## 2018-03-02 RX ADMIN — ALBUTEROL SULFATE PRN MG: 2.5 SOLUTION RESPIRATORY (INHALATION) at 13:19

## 2018-03-02 RX ADMIN — ENOXAPARIN SODIUM SCH MG: 100 INJECTION SUBCUTANEOUS at 09:17

## 2018-03-02 NOTE — PN
DATE:  03/02/2018



SUBJECTIVE:  The patient is seen and examined.  Interim events noted. 

Consults noted and appreciated.  Pulmonology and Hematology consult and

interventions noted and appreciated.  Case discussed with Hematology and

Oncology.  The patient feels okay.  Denies any chest pain, shortness of

breath _____.



PHYSICAL EXAMINATION:

GENERAL:  The patient is in no acute distress.

VITAL SIGNS:  Stable.

HEART:  S1 and S2, normal and regular.

LUNGS:  Good bilateral air exchange.

ABDOMEN:  Soft and nontender.

EXTREMITIES:  The patient has DVT.



LABORATORY DATA:  Available diagnostic data reviewed.



PLAN:  Overall, the patient is clinically stable.  We will discharge the

patient home.  Case and plan discussed with the patient.  Need for

anticoagulation, stopping oral contraceptives.  I explained to the patient

in detail and need for follow up with primary care physician in Hematology

Office _____, the patient understood and he is willing to do it.  Plan as

ordered.





__________________________________________

Tony Clayton MD





DD:  03/02/2018 8:55:39

DT:  03/02/2018 9:55:28

Job # [id number]

## 2018-03-03 LAB — SCREEN DRVVT: 42 SEC (ref ?–45)

## 2018-03-04 LAB — CARDIOLIPIN IGA SER IA-ACNC: 17 APL (ref ?–11)

## 2018-03-07 ENCOUNTER — HOSPITAL ENCOUNTER (INPATIENT)
Dept: HOSPITAL 14 - H.ER | Age: 38
LOS: 2 days | Discharge: HOME | DRG: 607 | End: 2018-03-09
Attending: INTERNAL MEDICINE | Admitting: INTERNAL MEDICINE
Payer: COMMERCIAL

## 2018-03-07 VITALS — BODY MASS INDEX: 31.6 KG/M2

## 2018-03-07 DIAGNOSIS — I27.82: ICD-10-CM

## 2018-03-07 DIAGNOSIS — R50.9: ICD-10-CM

## 2018-03-07 DIAGNOSIS — I82.531: ICD-10-CM

## 2018-03-07 DIAGNOSIS — T45.515A: ICD-10-CM

## 2018-03-07 DIAGNOSIS — Z79.01: ICD-10-CM

## 2018-03-07 DIAGNOSIS — L27.0: Primary | ICD-10-CM

## 2018-03-07 LAB
ALBUMIN SERPL-MCNC: 3.8 G/DL (ref 3.5–5)
ALBUMIN/GLOB SERPL: 1.2 {RATIO} (ref 1–2.1)
ALT SERPL-CCNC: 137 U/L (ref 9–52)
APTT BLD: 36 SECONDS (ref 25.6–37.1)
AST SERPL-CCNC: 47 U/L (ref 14–36)
BASOPHILS # BLD AUTO: 0 K/UL (ref 0–0.2)
BASOPHILS NFR BLD: 0.7 % (ref 0–2)
BUN SERPL-MCNC: 13 MG/DL (ref 7–17)
CALCIUM SERPL-MCNC: 8.9 MG/DL (ref 8.4–10.2)
EOSINOPHIL # BLD AUTO: 0.2 K/UL (ref 0–0.7)
EOSINOPHIL NFR BLD: 2.6 % (ref 0–4)
ERYTHROCYTE [DISTWIDTH] IN BLOOD BY AUTOMATED COUNT: 13.6 % (ref 11.5–14.5)
GFR NON-AFRICAN AMERICAN: > 60
HGB BLD-MCNC: 12.8 G/DL (ref 12–16)
INR PPP: 1.1 (ref 0.9–1.2)
LYMPHOCYTES # BLD AUTO: 1.6 K/UL (ref 1–4.3)
LYMPHOCYTES NFR BLD AUTO: 23.2 % (ref 20–40)
MCH RBC QN AUTO: 30.6 PG (ref 27–31)
MCHC RBC AUTO-ENTMCNC: 34.3 G/DL (ref 33–37)
MCV RBC AUTO: 89.3 FL (ref 81–99)
MONOCYTES # BLD: 0.3 K/UL (ref 0–0.8)
MONOCYTES NFR BLD: 4.8 % (ref 0–10)
NEUTROPHILS # BLD: 4.8 K/UL (ref 1.8–7)
NEUTROPHILS NFR BLD AUTO: 68.7 % (ref 50–75)
NRBC BLD AUTO-RTO: 0.1 % (ref 0–0)
PLATELET # BLD: 359 K/UL (ref 130–400)
PMV BLD AUTO: 7.7 FL (ref 7.2–11.7)
PROTHROMBIN TIME: 12.7 SECONDS (ref 9.8–13.1)
RBC # BLD AUTO: 4.18 MIL/UL (ref 3.8–5.2)
WBC # BLD AUTO: 7 K/UL (ref 4.8–10.8)

## 2018-03-07 NOTE — RAD
HISTORY:



COMPARISON:

02/27/2018.



TECHNIQUE:

Chest PA and lateral



FINDINGS:



LINES AND TUBES:

None. 



LUNG AND PLEURA:

The lungs are well inflated. There is subsegmental atelectasis in the 

lower lobes. 



HEART AND MEDIASTINUM:

The heart is not enlarged. The hilar and mediastinal contours are 

within normal limits.



SKELETAL STRUCTURES:

The bony structures are within normal limits for the patient's age.



VISUALIZED UPPER ABDOMEN:

Normal.



OTHER FINDINGS:

None.



IMPRESSION:

No acute findings.

## 2018-03-07 NOTE — ED PDOC
HPI: General Adult


Time Seen by Provider: 03/07/18 12:16


Chief Complaint (Nursing): Abnormal Skin Integrity


History Per: Patient


Additional Complaint(s): 





Pt. states on Monday she developed a pruritic rash on her R forearm which 

progressively worsened and today rash is now throughout the entire body. 

Further states she started Xarelto on Saturday and last dose she took was last 

night. She is currently taking Xarelto for R leg DVT and a PE. Denies SOB, 

chest pain, palpitations, fever, throat swelling, hx of allergic reactions. Of 

note, pt. has not tried any new medications other than xarelto and has not 

tried any foods. 





Past Medical History


Reviewed: Historical Data, Nursing Documentation, Vital Signs


Vital Signs: 


 Last Vital Signs











Temp  98.1 F   03/07/18 17:12


 


Pulse  97 H  03/07/18 18:06


 


Resp  17   03/07/18 18:06


 


BP  124/88   03/07/18 17:12


 


Pulse Ox  98   03/07/18 18:15














- Medical History


PMH: Deep Vein Thrombosis, Pulmonary Embolism





- Family History


Family History: States: Diabetes (mother), Hypertension (mother)





- Home Medications


Home Medications: 


 Ambulatory Orders











 Medication  Instructions  Recorded


 


Rivaroxaban [Xarelto] 15 mg PO BID #42 tab 03/01/18


 


Rivaroxaban [Xarelto] 20 mg PO DAILY #14 tab 03/01/18














- Allergies


Allergies/Adverse Reactions: 


 Allergies











Allergy/AdvReac Type Severity Reaction Status Date / Time


 


No Known Allergies Allergy   Verified 03/07/18 12:13














Review of Systems


ROS Statement: Except As Marked, All Systems Reviewed And Found Negative


Skin: Positive for: Rash





Physical Exam





- Physical Exam


Appears: Positive for: Well, Non-toxic, No Acute Distress


Skin: Positive for: Normal Color, Warm, Rash (diffuse urticarial rash without 

pustules or vesicles)


ENT: Positive for: Normal ENT Inspection.  Negative for: Pharyngeal Erythema, 

Tonsillar Exudate, Tonsillar Swelling


Neck: Positive for: Normal, Painless ROM


Cardiovascular/Chest: Positive for: Regular Rate, Rhythm


Respiratory: Positive for: Normal Breath Sounds.  Negative for: Wheezing, 

Respiratory Distress


Gastrointestinal/Abdominal: Positive for: Normal Exam, Soft.  Negative for: 

Tenderness


Neurologic/Psych: Positive for: Alert, Oriented





- Laboratory Results


Result Diagrams: 


 03/07/18 13:03





 03/07/18 13:03





- ECG


O2 Sat by Pulse Oximetry: 98





- Progress


ED Course And Treament: 





Labs ordered. Benadryl 50mg IV, solu-medrol 125mg IV, pepcid 40mg IV ordered. 


On re-evaluation, rash resolved. 


Case d/w Dr. Busby who recommends lovenox prior to starting Pradaxa and 

admission to hospital. 


As per Tracy, ED Pharmacist, Pradaxa cannot be started without 5-10 day 

parenteral anticoagulation.


There are no studies showing how to change from Xarelto to Pradaxa. 


Case d/w Dr. Clayton (pt was last admitted to Dr. Clayton's service last month), and 

arrangements made for admission.


Multiple calls made to Dr. Luz (Dr. Clayton's resident) but no answer. 





Disposition





- Clinical Impression


Clinical Impression: 


 Adverse reaction to drug, DVT (deep venous thrombosis), Pulmonary embolism








- Patient ED Disposition


Is Patient to be Admitted: Yes





- Disposition


Disposition Time: 14:21


Condition: FAIR





- Pt Status Changed To:


Hospital Disposition Of: Inpatient





- Admit Certification


Admit to Inpatient:: After my assessment, the patient will require 

hospitalization for at least two midnights.  This is because of the severity of 

symptoms shown, intensity of services needed, and/or the medical risk in this 

patient being treated as an outpatient.

## 2018-03-08 LAB
ALBUMIN SERPL-MCNC: 4 G/DL (ref 3.5–5)
ALBUMIN/GLOB SERPL: 1.2 {RATIO} (ref 1–2.1)
ALT SERPL-CCNC: 126 U/L (ref 9–52)
AST SERPL-CCNC: 45 U/L (ref 14–36)
BUN SERPL-MCNC: 16 MG/DL (ref 7–17)
CALCIUM SERPL-MCNC: 9.4 MG/DL (ref 8.4–10.2)
ERYTHROCYTE [DISTWIDTH] IN BLOOD BY AUTOMATED COUNT: 13.8 % (ref 11.5–14.5)
GFR NON-AFRICAN AMERICAN: > 60
HGB BLD-MCNC: 12.5 G/DL (ref 12–16)
MCH RBC QN AUTO: 30.1 PG (ref 27–31)
MCHC RBC AUTO-ENTMCNC: 33.9 G/DL (ref 33–37)
MCV RBC AUTO: 88.9 FL (ref 81–99)
PLATELET # BLD: 384 K/UL (ref 130–400)
RBC # BLD AUTO: 4.14 MIL/UL (ref 3.8–5.2)
WBC # BLD AUTO: 19.5 K/UL (ref 4.8–10.8)

## 2018-03-08 RX ADMIN — ENOXAPARIN SODIUM SCH MG: 100 INJECTION SUBCUTANEOUS at 08:41

## 2018-03-08 RX ADMIN — ENOXAPARIN SODIUM SCH MG: 100 INJECTION SUBCUTANEOUS at 21:05

## 2018-03-08 NOTE — US
PROCEDURE:  Bilateral lower extremity venous duplex Doppler. 



HISTORY:

DVT/Interval change



COMPARISON:

Lower extremity ultrasound dated 02/27/2018. 



TECHNIQUE:

Bilateral common femoral, superficial femoral, popliteal and 

posterior tibial veins were evaluated. Flow was assessed with color 

Doppler, compressibility, assessment of phasic flow and augmentation 

response. 



FINDINGS:



COMMON FEMORAL VEIN:

Right CFV:  Unremarkable.



Left CFV:  Unremarkable.



SUPERFICIAL FEMORAL VEIN:

Right SFV: Unremarkable.



Left SFV:  Unremarkable.



POPLITEAL VEIN:

Right Popliteal:  Intraluminal echogenic material, incomplete 

compressibility and partial Doppler flow.



Left Popliteal:  Unremarkable.



POSTERIOR TIBIAL VEIN:

Right PTV: Intraluminal echogenic material, incomplete 

compressibility and partial Doppler flow.



Left PTV: Unremarkable.



OTHER FINDINGS:

None.



IMPRESSION:

Interval propagation of known right lower extremity nonocclusive deep 

venous thrombosis into the posterior tibial veins.



No evidence of deep venous thrombosis in the left lower extremity 

venous system.



JOHNNY Tucker notified by the ultrasound technologist.

## 2018-03-08 NOTE — CP.PCM.CON
History of Present Illness





- History of Present Illness


History of Present Illness: 


This is a 38 yrs old female who was admitted because of an allergic reaction to 

the zarelto. 


Pt had surgery for plantar fasciatis 3 weeks ago. She developed a DVT right 

lower extremity and pulmonary embolism . She was initially started on lovenox 

and was given xarelto on discharge. Three days later she had a little rash on 

her neck, but soon spread to the entire body with itching. She came to the ER 

and was given benadryl. no chest pain  or shortness of breath. No calf pain.


 The rash has gone away as has te itching. Accordeing to the pt the only 

allergy she has is to lactose, but she did not drink any milk since she went 

home. she has not taken xarelto since then, and is on lovenox. 


She is to be seen by podiatry today.





Past Patient History





- Past Medical History & Family History


Past Medical History?: No





- Past Social History


Smoking Status: Never Smoked





- CARDIAC


Hx Cardiac Disorders: Yes


Hx Circulatory Problems: Yes





- PULMONARY


Hx Respiratory Disorders: Yes


Hx Pulmonary Embolism: Yes





- NEUROLOGICAL


Hx Neurological Disorder: No





- HEENT


Hx HEENT Problems: No





- RENAL


Hx Chronic Kidney Disease: No





- ENDOCRINE/METABOLIC


Hx Endocrine Disorders: No





- HEMATOLOGICAL/ONCOLOGICAL


Hx AIDS: No


Hx Human Immunodeficiency Virus (HIV): No





- INTEGUMENTARY


Hx Dermatological Problems: No





- MUSCULOSKELETAL/RHEUMATOLOGICAL


Hx Musculoskeletal Disorders: No


Hx Falls: No





- GASTROINTESTINAL


Hx Gastrointestinal Disorders: No





- GENITOURINARY/GYNECOLOGICAL


Hx Genitourinary Disorders: No





- PSYCHIATRIC


Hx Psychophysiologic Disorder: No


Hx Substance Use: No





- SURGICAL HISTORY


Hx Surgeries: Yes


Other/Comment: Right plantar fasciotomy





- ANESTHESIA


Hx Anesthesia: Yes


Hx Anesthesia Reactions: No


Hx Malignant Hyperthermia: No


Has any member of the family had a problem w/ anesthesia?: No





Meds


Allergies/Adverse Reactions: 


 Allergies











Allergy/AdvReac Type Severity Reaction Status Date / Time


 


No Known Allergies Allergy   Verified 03/07/18 12:13














- Medications


Medications: 


 Current Medications





Acetaminophen (Tylenol 325mg Tab)  650 mg PO Q4 PRN


   PRN Reason: Fever >100.4 F


   Last Admin: 03/07/18 21:12 Dose:  650 mg


Albuterol/Ipratropium (Duoneb 3 Mg/0.5 Mg (3 Ml) Ud)  3 ml INH RQ6 PRN


   PRN Reason: Shortness of Breath


Diphenhydramine HCl (Benadryl)  25 mg PO Q6 PRN


   PRN Reason: Itching / Pruritus


   Last Admin: 03/08/18 00:09 Dose:  25 mg


Enoxaparin Sodium (Lovenox)  90 mg SC Q12 BRADEN


   PRN Reason: Protocol


   Last Admin: 03/08/18 08:41 Dose:  90 mg











Physical Exam





- Additional Findings


Additional findings: 





Physical exam; Alert,well oriented in no acute distress. no rashes or itching


Neck; Supple, no adenopathy


Chest: clear, no rales or rhonchi


Heart; RSR, no murmur


Extremities. No swelling or tenderness.





Results





- Vital Signs


Recent Vital Signs: 


 Last Vital Signs











Temp  98.7 F   03/08/18 08:00


 


Pulse  86   03/08/18 08:00


 


Resp  20   03/08/18 08:00


 


BP  108/68   03/08/18 08:00


 


Pulse Ox  97   03/08/18 08:00














- Labs


Result Diagrams: 


 03/08/18 08:48





 03/07/18 13:03


Labs: 


 Laboratory Results - last 24 hr











  03/07/18 03/07/18 03/07/18





  13:03 13:03 13:03


 


WBC  7.0  


 


RBC  4.18  


 


Hgb  12.8  


 


Hct  37.3  


 


MCV  89.3  


 


MCH  30.6  


 


MCHC  34.3  


 


RDW  13.6  


 


Plt Count  359  


 


MPV  7.7  


 


Neut % (Auto)  68.7  


 


Lymph % (Auto)  23.2  


 


Mono % (Auto)  4.8  


 


Eos % (Auto)  2.6  


 


Baso % (Auto)  0.7  


 


Neut # (Auto)  4.8  


 


Lymph # (Auto)  1.6  


 


Mono # (Auto)  0.3  


 


Eos # (Auto)  0.2  


 


Baso # (Auto)  0.0  


 


PT    12.7


 


INR    1.1


 


APTT    36.0


 


Sodium   140 


 


Potassium   4.2 


 


Chloride   101 


 


Carbon Dioxide   26 


 


Anion Gap   17 


 


BUN   13 


 


Creatinine   0.5 L 


 


Est GFR ( Amer)   > 60 


 


Est GFR (Non-Af Amer)   > 60 


 


Random Glucose   117 H 


 


Calcium   8.9 


 


Total Bilirubin   0.3 


 


AST   47 H D 


 


ALT   137 H D 


 


Alkaline Phosphatase   70 


 


Total Protein   7.1 


 


Albumin   3.8 


 


Globulin   3.2 


 


Albumin/Globulin Ratio   1.2 














Assessment & Plan





- Assessment and Plan (Free Text)


Assessment: 





Impression:  Rashes secondary to allergic reaction to Zarelto.


                 DVT , lower extremity and pulmonary thrombosis


Plan: 


Plan; I would give her lovenox today and switch to eliquis tomorrow am. If no 

side effect, may discharge.


        Would repeat the venous doppler for the lower extremities.








- Date & Time


Date: 03/08/18


Time: 09:35

## 2018-03-08 NOTE — CP.PCM.HP
<John Luz - Last Filed: 03/08/18 07:05>





History of Present Illness





- History of Present Illness


History of Present Illness: 





Patient evaluated at bedside with Dr Clayton this morning. 


37 yo ,f, s/p Right plantar fasciotomy 1/31/18 presented to ED Yesterday c/o 

new onset rash after being DC from [facility] on 03/02/17 for DVT/PE and started on 

Xarelto for anticoagulation. Denies CP, SOB,  palpitations, changes in 

urination or stools, cough or diarrhea. As per patient rash appeared in the 

neck area and spread to limbs and abdomen, but now has almost completely 

subsided. She developed a low grade fever overnight in the hospital and has no 

new complains this morning. Xarelto was stopped at ED after suspicion of an 

allergic reaction. She received 90 mg of Lovenox SQ, Bendadryl and 

Methylprednisolone 





Present on Admission





- Present on Admission


Any Indicators Present on Admission: Yes


History of DVT/PE: Yes





Review of Systems





- Review of Systems


All systems: reviewed and no additional remarkable complaints except





- Integumentary


Integumentary: Rash





Past Patient History





- Past Medical History & Family History


Past Medical History?: No





- Past Social History


Smoking Status: Never Smoked





- CARDIAC


Hx Cardiac Disorders: Yes


Hx Circulatory Problems: Yes





- PULMONARY


Hx Respiratory Disorders: Yes


Hx Pulmonary Embolism: Yes





- NEUROLOGICAL


Hx Neurological Disorder: No





- HEENT


Hx HEENT Problems: No





- RENAL


Hx Chronic Kidney Disease: No





- ENDOCRINE/METABOLIC


Hx Endocrine Disorders: No





- HEMATOLOGICAL/ONCOLOGICAL


Hx AIDS: No


Hx Human Immunodeficiency Virus (HIV): No





- INTEGUMENTARY


Hx Dermatological Problems: No





- MUSCULOSKELETAL/RHEUMATOLOGICAL


Hx Musculoskeletal Disorders: No


Hx Falls: No





- GASTROINTESTINAL


Hx Gastrointestinal Disorders: No





- GENITOURINARY/GYNECOLOGICAL


Hx Genitourinary Disorders: No





- PSYCHIATRIC


Hx Psychophysiologic Disorder: No


Hx Substance Use: No





- SURGICAL HISTORY


Hx Surgeries: Yes


Other/Comment: Right plantar fasciotomy





- ANESTHESIA


Hx Anesthesia: Yes


Hx Anesthesia Reactions: No


Hx Malignant Hyperthermia: No


Has any member of the family had a problem w/ anesthesia?: No





Meds


Allergies/Adverse Reactions: 


 Allergies











Allergy/AdvReac Type Severity Reaction Status Date / Time


 


rivaroxaban [From Xarelto] AdvReac  RASH Verified 03/09/18 02:06














Physical Exam





- Constitutional


Appears: Non-toxic, No Acute Distress





- Head Exam


Head Exam: NORMAL INSPECTION





- Eye Exam


Eye Exam: EOMI, PERRL





- ENT Exam


ENT Exam: Mucous Membranes Moist





- Respiratory Exam


Respiratory Exam: Clear to Auscultation Bilateral, NORMAL BREATHING PATTERN.  

absent: Rhonchi, Respiratory Distress





- Cardiovascular Exam


Cardiovascular Exam: REGULAR RHYTHM, +S1, +S2.  absent: Gallop





- GI/Abdominal Exam


GI & Abdominal Exam: Normal Bowel Sounds, Soft.  absent: Tenderness





- Extremities Exam


Extremities exam: Positive for: normal inspection.  Negative for: calf 

tenderness, pedal edema





- Neurological Exam


Neurological exam: Alert, Normal Gait, Oriented x3





- Psychiatric Exam


Psychiatric exam: Normal Affect, Normal Mood





- Skin


Skin Exam: Rash (few macular/pink lesions noticed on collar area and abd that 

appear fading), Warm





Results





- Vital Signs


Recent Vital Signs: 





 Last Vital Signs











Temp  97.4 F L  03/08/18 05:00


 


Pulse  65   03/08/18 05:00


 


Resp  16   03/08/18 05:00


 


BP  105/63   03/08/18 05:00


 


Pulse Ox  99   03/08/18 05:00














- Labs


Result Diagrams: 


 03/07/18 13:03





 03/07/18 13:03


Labs: 





 Laboratory Results - last 24 hr











  03/07/18 03/07/18 03/07/18





  13:03 13:03 13:03


 


WBC  7.0  


 


RBC  4.18  


 


Hgb  12.8  


 


Hct  37.3  


 


MCV  89.3  


 


MCH  30.6  


 


MCHC  34.3  


 


RDW  13.6  


 


Plt Count  359  


 


MPV  7.7  


 


Neut % (Auto)  68.7  


 


Lymph % (Auto)  23.2  


 


Mono % (Auto)  4.8  


 


Eos % (Auto)  2.6  


 


Baso % (Auto)  0.7  


 


Neut # (Auto)  4.8  


 


Lymph # (Auto)  1.6  


 


Mono # (Auto)  0.3  


 


Eos # (Auto)  0.2  


 


Baso # (Auto)  0.0  


 


PT    12.7


 


INR    1.1


 


APTT    36.0


 


Sodium   140 


 


Potassium   4.2 


 


Chloride   101 


 


Carbon Dioxide   26 


 


Anion Gap   17 


 


BUN   13 


 


Creatinine   0.5 L 


 


Est GFR ( Amer)   > 60 


 


Est GFR (Non-Af Amer)   > 60 


 


Random Glucose   117 H 


 


Calcium   8.9 


 


Total Bilirubin   0.3 


 


AST   47 H D 


 


ALT   137 H D 


 


Alkaline Phosphatase   70 


 


Total Protein   7.1 


 


Albumin   3.8 


 


Globulin   3.2 


 


Albumin/Globulin Ratio   1.2 














Assessment & Plan





- Assessment and Plan (Free Text)


Assessment: 





Allergic reaction poss due to medication side effect


Stop Xarelto and start Lovenox and poss bridge to Pradexa/Eliquis


Hem-onc consult


IV NS 1L


C/W Benadryl PRN





DVT/PE


Stable


Asymptomatic


F/U Hem-onc recs for anticoagulation





Low grade fever


100.4 last night


Asymptomatic


Monitor


F/U Cultures





<Clayton,Tony K - Last Filed: 03/10/18 10:58>





Results





- Vital Signs


Recent Vital Signs: 





 Last Vital Signs











Temp  98.9 F   03/09/18 16:22


 


Pulse  81   03/09/18 16:22


 


Resp  20   03/09/18 16:22


 


BP  105/68   03/09/18 16:22


 


Pulse Ox  97   03/09/18 16:22














- Labs


Result Diagrams: 


 03/09/18 08:40





 03/09/18 08:40





Assessment & Plan





- Assessment and Plan (Free Text)


Assessment: 








Patient was personally seen and examined by me in rounds with residents.


Available labs and diagnostic data reviewed.


Case, patient's conditions and management plan discussed with residents in 

rounds. 


Agree with resident's progress note.


Plan: As ordered.

## 2018-03-09 VITALS
TEMPERATURE: 98.9 F | SYSTOLIC BLOOD PRESSURE: 105 MMHG | DIASTOLIC BLOOD PRESSURE: 68 MMHG | RESPIRATION RATE: 20 BRPM | OXYGEN SATURATION: 97 % | HEART RATE: 81 BPM

## 2018-03-09 LAB
ALBUMIN SERPL-MCNC: 3.8 G/DL (ref 3.5–5)
ALBUMIN/GLOB SERPL: 1.1 {RATIO} (ref 1–2.1)
ALT SERPL-CCNC: 99 U/L (ref 9–52)
AST SERPL-CCNC: 32 U/L (ref 14–36)
BUN SERPL-MCNC: 18 MG/DL (ref 7–17)
CALCIUM SERPL-MCNC: 9 MG/DL (ref 8.4–10.2)
ERYTHROCYTE [DISTWIDTH] IN BLOOD BY AUTOMATED COUNT: 14.1 % (ref 11.5–14.5)
GFR NON-AFRICAN AMERICAN: > 60
HGB BLD-MCNC: 12.6 G/DL (ref 12–16)
MCH RBC QN AUTO: 30.1 PG (ref 27–31)
MCHC RBC AUTO-ENTMCNC: 33.4 G/DL (ref 33–37)
MCV RBC AUTO: 90.2 FL (ref 81–99)
PLATELET # BLD: 370 K/UL (ref 130–400)
RBC # BLD AUTO: 4.18 MIL/UL (ref 3.8–5.2)
WBC # BLD AUTO: 9.9 K/UL (ref 4.8–10.8)

## 2018-03-09 RX ADMIN — ENOXAPARIN SODIUM SCH MG: 100 INJECTION SUBCUTANEOUS at 08:49

## 2018-03-09 NOTE — CP.PCM.DIS
Provider





- Provider


Date of Admission: 


03/07/18 14:21





Attending physician: 


Tony Clayton MD





Consults: 





Hem-Onc


Time Spent in preparation of Discharge (in minutes): 30





Diagnosis





- Discharge Diagnosis


(1) Adverse reaction to drug


Status: Resolved   


Comment: Xarelto stopped. Patient to be DC on Eliquis BID   





(2) DVT (deep venous thrombosis)


Status: Acute   


Comment: Stable. C/W Anticoagulant 3-6 months   





(3) Pulmonary embolism


Status: Acute   


Comment: Asymptomatic   





Hospital Course





- Lab Results


Lab Results: 


 Micro Results





03/08/18 06:11   Urine,Clean Catch   Urine Culture - Final


                            No Growth (<1,000 CFU/ML)


03/07/18 20:39   Blood-Venous   Blood Culture - Preliminary


                            NO GROWTH AFTER 24 HOURS





 Most Recent Lab Values











WBC  9.9 K/uL (4.8-10.8)   03/09/18  08:40    


 


RBC  4.18 Mil/uL (3.80-5.20)   03/09/18  08:40    


 


Hgb  12.6 g/dL (12.0-16.0)   03/09/18  08:40    


 


Hct  37.7 % (34.0-47.0)   03/09/18  08:40    


 


MCV  90.2 fl (81.0-99.0)   03/09/18  08:40    


 


MCH  30.1 pg (27.0-31.0)   03/09/18  08:40    


 


MCHC  33.4 g/dL (33.0-37.0)   03/09/18  08:40    


 


RDW  14.1 % (11.5-14.5)   03/09/18  08:40    


 


Plt Count  370 K/uL (130-400)   03/09/18  08:40    


 


MPV  7.7 fl (7.2-11.7)   03/07/18  13:03    


 


Neut % (Auto)  68.7 % (50.0-75.0)   03/07/18  13:03    


 


Lymph % (Auto)  23.2 % (20.0-40.0)   03/07/18  13:03    


 


Mono % (Auto)  4.8 % (0.0-10.0)   03/07/18  13:03    


 


Eos % (Auto)  2.6 % (0.0-4.0)   03/07/18  13:03    


 


Baso % (Auto)  0.7 % (0.0-2.0)   03/07/18  13:03    


 


Neut # (Auto)  4.8 K/uL (1.8-7.0)   03/07/18  13:03    


 


Lymph # (Auto)  1.6 K/uL (1.0-4.3)   03/07/18  13:03    


 


Mono # (Auto)  0.3 K/uL (0.0-0.8)   03/07/18  13:03    


 


Eos # (Auto)  0.2 K/uL (0.0-0.7)   03/07/18  13:03    


 


Baso # (Auto)  0.0 K/uL (0.0-0.2)   03/07/18  13:03    


 


PT  12.7 Seconds (9.8-13.1)   03/07/18  13:03    


 


INR  1.1  (0.9-1.2)   03/07/18  13:03    


 


APTT  36.0 Seconds (25.6-37.1)   03/07/18  13:03    


 


Sodium  143 mmol/l (132-148)   03/09/18  08:40    


 


Potassium  4.1 MMOL/L (3.6-5.0)   03/09/18  08:40    


 


Chloride  105 mmol/L ()   03/09/18  08:40    


 


Carbon Dioxide  26 mmol/L (22-30)   03/09/18  08:40    


 


Anion Gap  16  (10-20)   03/09/18  08:40    


 


BUN  18 mg/dl (7-17)  H  03/09/18  08:40    


 


Creatinine  0.6 mg/dl (0.7-1.2)  L  03/09/18  08:40    


 


Est GFR ( Amer)  > 60   03/09/18  08:40    


 


Est GFR (Non-Af Amer)  > 60   03/09/18  08:40    


 


Random Glucose  81 mg/dL ()   03/09/18  08:40    


 


Calcium  9.0 mg/dL (8.4-10.2)   03/09/18  08:40    


 


Total Bilirubin  0.3 mg/dl (0.2-1.3)   03/09/18  08:40    


 


AST  32 U/L (14-36)   03/09/18  08:40    


 


ALT  99 U/L (9-52)  H D 03/09/18  08:40    


 


Alkaline Phosphatase  63 U/L ()   03/09/18  08:40    


 


Total Protein  7.2 G/DL (6.3-8.2)   03/09/18  08:40    


 


Albumin  3.8 g/dL (3.5-5.0)   03/09/18  08:40    


 


Globulin  3.3 gm/dL (2.2-3.9)   03/09/18  08:40    


 


Albumin/Globulin Ratio  1.1  (1.0-2.1)   03/09/18  08:40    














- Hospital Course


Hospital Course: 





Patient evaluated at bedside with Dr Clayton this morning. 


37 yo ,f, s/p Right plantar fasciotomy 1/31/18 admitted to hosp this time 

because new onset rash after being DC from [facility] on 03/02/17 for DVT/PE and 

started on Xarelto for anticoagulation.  She developed a low grade fever the 

first night she stayed in the hospital and has no new complains this morning. 

Xarelto was stopped at ED after suspicion of an allergic reaction. She was 

started 90 mg of Lovenox SQ BID until this morning when she was switched to 

Eliquis 2.5mg BID. IF she tolerates it well she will eb DC home in the evening 

as per Hem-onc recs.





Discharge Exam





- Head Exam


Head Exam: NORMAL INSPECTION





- Eye Exam


Eye Exam: EOMI, PERRL





- ENT Exam


ENT Exam: Mucous Membranes Moist





- Respiratory Exam


Respiratory Exam: Clear to PA & Lateral, NORMAL BREATHING PATTERN, UNREMARKABLE





- Cardiovascular Exam


Cardiovascular Exam: REGULAR RHYTHM, +S1, +S2.  absent: Gallop





- GI/Abdominal Exam


GI & Abdominal Exam: Normal Bowel Sounds, Soft, Unremarkable.  absent: 

Tenderness





- Neurological Exam


Neurological exam: Alert, Normal Gait, Oriented x3





- Psychiatric Exam


Psychiatric exam: Normal Affect, Normal Mood





- Skin


Skin Exam: Intact, Normal Color, Warm





Discharge Plan





- Discharge Medications


Prescriptions: 


Apixaban [Eliquis] 2.5 mg PO BID #60 tab





- Follow Up Plan


Condition: STABLE


Disposition: HOME/ ROUTINE


Patient education suggested?: Yes


Instructions:  Adverse Drug Reactions, Adult (DC), Deep Vein Thrombosis (Blood 

Clots in the Legs) (DC), Pulmonary Embolism (Blood Clot in the Lungs) (DC)


Referrals: 


Yanet Yap MD [Staff Provider] - 


Tony Clayton MD [Staff Provider] -

## 2018-03-09 NOTE — CP.PCM.PN
Subjective





- Date & Time of Evaluation


Date of Evaluation: 03/09/18


Time of Evaluation: 09:45





- Subjective


Subjective: 





Pt has absolutely no symptoms. no shortness of breath or chest pain. No more 

rashes either, Will start her on eliquis 2.5 mg bid. and discharge her in the 

evening.





Objective





- Vital Signs/Intake and Output


Vital Signs (last 24 hours): 


 











Temp Pulse Resp BP Pulse Ox


 


 98.2 F   77   18   107/73   99 


 


 03/09/18 08:00  03/09/18 08:00  03/09/18 08:00  03/09/18 08:00  03/09/18 08:00











- Medications


Medications: 


 Current Medications





Acetaminophen (Tylenol 325mg Tab)  650 mg PO Q4 PRN


   PRN Reason: Fever >100.4 F


   Last Admin: 03/07/18 21:12 Dose:  650 mg


Albuterol/Ipratropium (Duoneb 3 Mg/0.5 Mg (3 Ml) Ud)  3 ml INH RQ6 PRN


   PRN Reason: Shortness of Breath


Diphenhydramine HCl (Benadryl)  25 mg PO Q6 PRN


   PRN Reason: Itching / Pruritus


   Last Admin: 03/08/18 00:09 Dose:  25 mg


Enoxaparin Sodium (Lovenox)  90 mg SC Q12 BRADEN


   PRN Reason: Protocol


   Last Admin: 03/09/18 08:49 Dose:  90 mg











- Labs


Labs: 


 





 03/09/18 08:40 





 03/09/18 08:40 





 











PT  12.7 Seconds (9.8-13.1)   03/07/18  13:03    


 


INR  1.1  (0.9-1.2)   03/07/18  13:03    


 


APTT  36.0 Seconds (25.6-37.1)   03/07/18  13:03

## 2018-03-11 NOTE — PQF GENQUE
Dr. Clayton pt was admitted with allergic reaction to xarelto due to deep vein 
thrombosis and pulmonary embolism. Please clarify below acuity of deep vein 
thrombosis and pulmonary embolism.



Deep vein thrombosis []acute     []chronic     []history

Pulmonary embolism  []acute     []chronic     []history 



***** This form is a permanent part of the medical record*****





          

Clarification of your documentation is requested to better reflect the severity 
of illness and intensity of treatment of your patient.  



Indicators present      



[]  Specify: []

         



[]  Specify: []         

 



[]  Specify: []         





[]  Specify: []         





Location in the medical record that reflects the above clinical findings:  []

 



Treatment Provided:  []

  

PHYSICIAN'S RESPONSE





Based on your medical judgment of the clinical indicators outlined above please 
clarify the following:



[]  Practitioner response 



[]  If unable to determine, please check the box, sign and date.  





Present On Admission (POA) Indicator:





[] Present at the time of admission 



[] Not present at the time of admission



[] Clinically Undetermined









In responding to this query, please exercise your independent professional 
judgment.  The fact that a question is asked does not imply that any particular 
answer is desired or expected.  Thank you for your clarification on this 
documentation.



If you have any questions please call:[ ]

* Thank you,

   [ ]Sofia Dominguez

   [employer]
[facility]